# Patient Record
Sex: MALE | Race: WHITE | Employment: FULL TIME | URBAN - METROPOLITAN AREA
[De-identification: names, ages, dates, MRNs, and addresses within clinical notes are randomized per-mention and may not be internally consistent; named-entity substitution may affect disease eponyms.]

---

## 2019-04-01 ENCOUNTER — HOSPITAL ENCOUNTER (EMERGENCY)
Age: 19
Discharge: HOME | End: 2019-04-01
Payer: MEDICAID

## 2019-04-01 VITALS
OXYGEN SATURATION: 98 % | SYSTOLIC BLOOD PRESSURE: 128 MMHG | BODY MASS INDEX: 19.7 KG/M2 | HEIGHT: 68 IN | WEIGHT: 130 LBS | RESPIRATION RATE: 16 BRPM | HEART RATE: 74 BPM | DIASTOLIC BLOOD PRESSURE: 70 MMHG | TEMPERATURE: 98.4 F

## 2019-04-01 DIAGNOSIS — R21 RASH: ICD-10-CM

## 2019-04-01 DIAGNOSIS — L23.9 CUTANEOUS HYPERSENSITIVITY: ICD-10-CM

## 2019-04-01 DIAGNOSIS — B35.6 TINEA CRURIS: Primary | ICD-10-CM

## 2019-04-01 LAB
GLUCOSE BLD-MCNC: 92 MG/DL (ref 70–99)
PERFORMED ON: NORMAL

## 2019-04-01 PROCEDURE — 99283 EMERGENCY DEPT VISIT LOW MDM: CPT

## 2019-04-01 RX ORDER — DEXTROAMPHETAMINE SACCHARATE, AMPHETAMINE ASPARTATE MONOHYDRATE, DEXTROAMPHETAMINE SULFATE AND AMPHETAMINE SULFATE 7.5; 7.5; 7.5; 7.5 MG/1; MG/1; MG/1; MG/1
CAPSULE, EXTENDED RELEASE ORAL
Refills: 0 | COMMUNITY
Start: 2019-03-11

## 2019-04-01 RX ORDER — CETIRIZINE HYDROCHLORIDE 10 MG/1
TABLET ORAL
COMMUNITY
Start: 2015-02-07

## 2019-04-01 RX ORDER — CLOTRIMAZOLE 1 %
CREAM (GRAM) TOPICAL
Qty: 1 TUBE | Refills: 1 | Status: SHIPPED | OUTPATIENT
Start: 2019-04-01 | End: 2019-04-08

## 2019-04-01 ASSESSMENT — ENCOUNTER SYMPTOMS
ABDOMINAL DISTENTION: 0
ALLERGIC/IMMUNOLOGIC NEGATIVE: 1
STRIDOR: 0
SHORTNESS OF BREATH: 0
VOMITING: 0
WHEEZING: 0
NAUSEA: 0
DIARRHEA: 0
COLOR CHANGE: 0
CONSTIPATION: 0
ABDOMINAL PAIN: 0
COUGH: 0

## 2019-04-01 ASSESSMENT — PAIN SCALES - GENERAL: PAINLEVEL_OUTOF10: 8

## 2019-04-02 NOTE — ED PROVIDER NOTES
**EVALUATED BY ADVANCED PRACTICE PROVIDER**        [address]  eMERGENCY dEPARTMENT eNCOUnter      Pt Name: Laura Lorenzo  LYX:[id number]  [birth year][place]ilio 2000  Date of evaluation: 4/1/2019  Provider: Mykel Sinclair PA-C      Chief Complaint:    Chief Complaint   Patient presents with    Rash     rash for forearm x2 weeks, complaint of rash to thighs as well and difficulty walking    Back Pain     complaints of nerve pain and feeling like his skin is on fire       Nursing Notes, Past Medical Hx, Past Surgical Hx, Social Hx, Allergies, and Family Hx were all reviewed and agreed with or any disagreements were addressed in the HPI.    HPI:  (Location, Duration, Timing, Severity,Quality, Assoc Sx, Context, Modifying factors)  This is a  20 y.o. male who presents to the emergency department complaining of rash on the left side of his groin for several weeks. He admits that he does sweat a lot in this area. He has no history of diabetes. He is a thin individual.  He denies any penile pain, discharge, testicular pain or swelling. He also reports that for the past week or so, he has had a skin irritation on the flexor surface of the right forearm. He wears a workband and around this wrist and thinks that this has irritated the area. Both groin and right wrist rashes are very itchy and nontender. Also, intermittently for several days, patient reports brief episodes of hypersensitivity to the upper back bilaterally. When it is present, the skin is very sensitive to light touch and feels a burning sensation. He is not experiencing this discomfort at this time. PastMedical/Surgical History:      Diagnosis Date    ADD (attention deficit disorder with hyperactivity)      History reviewed. No pertinent surgical history.     Medications:  Discharge Medication List as of 4/1/2019 10:47 PM      CONTINUE these medications which have NOT CHANGED    Details   cetirizine (ZYRTEC) 10 MG tablet . Historical Med      amphetamine-dextroamphetamine (ADDERALL XR) 30 MG extended release capsule TAKE ONE CAPSULE BY MOUTH EVERY MORNING, R-0Historical Med      UNKNOWN TO PATIENT Allergy medication      ibuprofen (ADVIL;MOTRIN) 600 MG tablet Take 1 tablet by mouth every 6 hours as needed for Pain, Disp-30 tablet, R-0      acetaminophen-codeine (TYLENOL/CODEINE #3) 300-30 MG per tablet Take 1 tablet by mouth every 4 hours as needed for Pain., Disp-20 tablet, R-0               Review of Systems:  Review of Systems   Constitutional: Negative for chills, fever and unexpected weight change. HENT: Negative. Eyes: Negative for visual disturbance. Respiratory: Negative for cough, shortness of breath, wheezing and stridor. Cardiovascular: Negative for chest pain, palpitations and leg swelling. Gastrointestinal: Negative for abdominal distention, abdominal pain, constipation, diarrhea, nausea and vomiting. Endocrine: Negative. Negative for polydipsia, polyphagia and polyuria. Genitourinary: Negative. Musculoskeletal: Negative for neck pain and neck stiffness. Back pain: skin hypersensitivity. Skin: Positive for rash. Negative for color change, pallor and wound. Allergic/Immunologic: Negative. Neurological: Negative for dizziness, tremors, seizures, syncope, facial asymmetry, speech difficulty, weakness, light-headedness, numbness and headaches. Hematological: Negative. Psychiatric/Behavioral: Negative for confusion. All other systems reviewed and are negative. Positives and Pertinent negatives as per HPI. Except as noted above in the ROS, problem specific ROS was completed and is negative. Physical Exam:  Physical Exam   Constitutional: He is oriented to person, place, and time. He appears well-developed and well-nourished. No distress. HENT:   Head: Normocephalic and atraumatic.    Right Ear: External ear normal.   Left Ear: External ear normal.   Nose: Nose normal. Mouth/Throat: Oropharynx is clear and moist.   Eyes: Pupils are equal, round, and reactive to light. Conjunctivae and EOM are normal. Right eye exhibits no discharge. Left eye exhibits no discharge. No scleral icterus. Neck: Normal range of motion. No JVD present. No Brudzinski's sign and no Kernig's sign noted. Cardiovascular: Normal rate and regular rhythm. No murmur heard. Pulmonary/Chest: Effort normal and breath sounds normal.   Abdominal: Soft. Bowel sounds are normal. He exhibits no abdominal bruit and no pulsatile midline mass. There is no tenderness. There is no rigidity, no rebound, no guarding, no CVA tenderness, no tenderness at McBurney's point and negative Logan's sign. Musculoskeletal: Normal range of motion. Cervical back: Normal.        Thoracic back: Normal.        Lumbar back: Normal.   No midline vertebral tenderness or step-off deformity. Negative straight leg raise. No saddle paresthesia or foot drop. Able to heel and toe walk without difficulty or deficit. 5/5 strength in all four extremities without focal weakness, paresthesia or radiculopathy   Lymphadenopathy:     He has no cervical adenopathy. Neurological: He is alert and oriented to person, place, and time. He displays normal reflexes. No sensory deficit. Skin: Skin is warm and dry. Capillary refill takes less than 2 seconds. Rash (tinea cruris rash on the left inguinal area. no other genital lesions, chancre, vesicle, abscess, edema. no tenderness. there is also a dry scaly patch of itchy skin on the flexor aspect of the distal right forearm) noted. He is not diaphoretic. No erythema. No pallor. No palmar or plantar rash     Psychiatric: He has a normal mood and affect. His behavior is normal.   Nursing note and vitals reviewed.       MEDICAL DECISION MAKING    Vitals:    Vitals:    04/01/19 2125   BP: 128/70   Pulse: 74   Resp: 16   Temp: 98.4 °F (36.9 °C)   SpO2: 98%   Weight: 130 lb (59 kg) Height: 5' 8\" (1.727 m)       LABS:  Labs Reviewed   POCT GLUCOSE    Narrative:     Performed at:  [facility]  [address], [address]   Phone [phone]   POCT GLUCOSE        Remainder of labs reviewed and werenegative at this time or not returned at the time of this note. RADIOLOGY:   Non-plain film images such as CT, Ultrasound and MRI are read by the radiologist. Sakshi Bonilla PA-C have directly visualized the radiologic plain film image(s) with the below findings:        Interpretation per the Radiologist below, if available at the time of thisnote:    No orders to display        No results found. MEDICAL DECISION MAKING / ED COURSE:      PROCEDURES:   Procedures    None    Patient was given:   Medications - No data to display    This patient presents complaining of left groin rash, right wrist rash and cutaneous hypersensitivity to the upper back intermittently. I cannot reproduce the upper back discomfort at this time. I do not see rash overlying this area. Glucose is stable, decreasing suspicion for diabetes. The rash in the groin consistent with tinea cruris. Rash on the right wrist could be consistent with contact dermatitis or possible fungal infection as well. The patient will be sent home with Lotrimin ointment and was given PCP and dermatology referrals. He was given return precautions. He denies history of IV drug abuse, HIV or other immunocompromised disease. My suspicion is low for life threatening skin infection SJS, EM, TEN, meningococcemia, Kawasaki disease, abscess, cellulitis, lymphangitis, lymphadenitis, RMSF, vasculitis, Lyme disease, HSP, angioedema, anaphylaxis, malar rash, syphilis, AIDS, bullous pemphigoid, pemphigus vulgaris, endocarditis, necrotizing fasciitis,fracture or dislocation, epidural abscess or hematoma, discitis, meningitis, encephalitis, transverse myelitis, pyelonephritis, perinephric abscess, urosepsis, kidney stone, AAA, dissection, shingles,  Or other concerning pathology. We have addressed concerns and expectations. The patient tolerated their visit well. I evaluated the patient. The physician was available for consultation as needed. The patient and / or the family were informed of the results of anytests, a time was given to answer questions, a plan was proposed and they agreed with plan. CLINICAL IMPRESSION:  1. Tinea cruris    2. Rash    3. Cutaneous hypersensitivity        DISPOSITION Decision To Discharge 04/01/2019 10:39:13 PM      PATIENT REFERRED TO:  [facility]    In 3 days      Izzy Romano MD  [address] [place] [address], [facility] [address]  [facility]  [phone]      for dermatology follow up      DISCHARGE MEDICATIONS:  Discharge Medication List as of 4/1/2019 10:47 PM      START taking these medications    Details   clotrimazole (LOTRIMIN AF) 1 % cream Apply topically 2 times daily. , Disp-1 Tube, R-1, Print             DISCONTINUED MEDICATIONS:  Discharge Medication List as of 4/1/2019 10:47 PM                 (Please note the MDM and HPI sections of this note were completed with a voice recognition program.  Efforts weremade to edit the dictations but occasionally words are mis-transcribed.)    Electronically signed, Greg Estrella PA-C,          Greg Estrella PA-C  04/02/19 9085

## 2019-04-02 NOTE — ED NOTES
Pt d/c instructions given, v/u. Denies needs at this time. A&O with no signs of distress. Pt ambulated to exit.        Kia Umanzor RN  04/01/19 6701

## 2019-12-02 VITALS
SYSTOLIC BLOOD PRESSURE: 121 MMHG | OXYGEN SATURATION: 97 % | TEMPERATURE: 98.4 F | DIASTOLIC BLOOD PRESSURE: 73 MMHG | HEART RATE: 102 BPM | RESPIRATION RATE: 16 BRPM

## 2019-12-02 LAB
BILIRUBIN URINE: NEGATIVE
BLOOD, URINE: NEGATIVE
CLARITY: ABNORMAL
COLOR: YELLOW
EPITHELIAL CELLS, UA: 0 /HPF (ref 0–5)
GLUCOSE URINE: NEGATIVE MG/DL
HYALINE CASTS: 15 /LPF (ref 0–8)
KETONES, URINE: NEGATIVE MG/DL
LEUKOCYTE ESTERASE, URINE: ABNORMAL
MICROSCOPIC EXAMINATION: YES
NITRITE, URINE: NEGATIVE
PH UA: 6.5 (ref 5–8)
PROTEIN UA: NEGATIVE MG/DL
RBC UA: 3 /HPF (ref 0–4)
SPECIFIC GRAVITY UA: 1.03 (ref 1–1.03)
URINE REFLEX TO CULTURE: YES
URINE TRICHOMONAS EVALUATION: NORMAL
URINE TYPE: ABNORMAL
UROBILINOGEN, URINE: 1 E.U./DL
WBC UA: 90 /HPF (ref 0–5)

## 2019-12-02 PROCEDURE — 87591 N.GONORRHOEAE DNA AMP PROB: CPT

## 2019-12-02 PROCEDURE — 81001 URINALYSIS AUTO W/SCOPE: CPT

## 2019-12-02 PROCEDURE — 87491 CHLMYD TRACH DNA AMP PROBE: CPT

## 2019-12-02 PROCEDURE — 87086 URINE CULTURE/COLONY COUNT: CPT

## 2019-12-03 ENCOUNTER — HOSPITAL ENCOUNTER (EMERGENCY)
Age: 19
Discharge: HOME | End: 2019-12-03
Payer: MEDICAID

## 2019-12-03 DIAGNOSIS — Z20.2 STD EXPOSURE: ICD-10-CM

## 2019-12-03 DIAGNOSIS — R36.9 PENILE DISCHARGE: Primary | ICD-10-CM

## 2019-12-03 DIAGNOSIS — N34.2 URETHRITIS: ICD-10-CM

## 2019-12-03 PROCEDURE — 99283 EMERGENCY DEPT VISIT LOW MDM: CPT

## 2019-12-03 PROCEDURE — 6360000002 HC RX W HCPCS: Performed by: PHYSICIAN ASSISTANT

## 2019-12-03 PROCEDURE — 6370000000 HC RX 637 (ALT 250 FOR IP): Performed by: PHYSICIAN ASSISTANT

## 2019-12-03 PROCEDURE — 96372 THER/PROPH/DIAG INJ SC/IM: CPT

## 2019-12-03 PROCEDURE — 2580000003 HC RX 258

## 2019-12-03 RX ORDER — PHENAZOPYRIDINE HYDROCHLORIDE 100 MG/1
200 TABLET, FILM COATED ORAL ONCE
Status: COMPLETED | OUTPATIENT
Start: 2019-12-03 | End: 2019-12-03

## 2019-12-03 RX ORDER — AZITHROMYCIN 250 MG/1
1000 TABLET, FILM COATED ORAL ONCE
Status: COMPLETED | OUTPATIENT
Start: 2019-12-03 | End: 2019-12-03

## 2019-12-03 RX ORDER — IBUPROFEN 800 MG/1
800 TABLET ORAL ONCE
Status: COMPLETED | OUTPATIENT
Start: 2019-12-03 | End: 2019-12-03

## 2019-12-03 RX ORDER — CEFTRIAXONE SODIUM 250 MG/1
250 INJECTION, POWDER, FOR SOLUTION INTRAMUSCULAR; INTRAVENOUS ONCE
Status: COMPLETED | OUTPATIENT
Start: 2019-12-03 | End: 2019-12-03

## 2019-12-03 RX ADMIN — WATER: 1 INJECTION INTRAMUSCULAR; INTRAVENOUS; SUBCUTANEOUS at 00:19

## 2019-12-03 RX ADMIN — CEFTRIAXONE SODIUM 250 MG: 250 INJECTION, POWDER, FOR SOLUTION INTRAMUSCULAR; INTRAVENOUS at 00:19

## 2019-12-03 RX ADMIN — AZITHROMYCIN MONOHYDRATE 1000 MG: 250 TABLET ORAL at 00:19

## 2019-12-03 RX ADMIN — IBUPROFEN 800 MG: 800 TABLET, FILM COATED ORAL at 00:18

## 2019-12-03 RX ADMIN — PHENAZOPYRIDINE 200 MG: 100 TABLET ORAL at 00:18

## 2019-12-03 ASSESSMENT — ENCOUNTER SYMPTOMS
RHINORRHEA: 0
VOMITING: 0
ABDOMINAL PAIN: 0
WHEEZING: 0
COUGH: 0
NAUSEA: 0
SHORTNESS OF BREATH: 0
DIARRHEA: 0

## 2019-12-03 ASSESSMENT — PAIN SCALES - GENERAL: PAINLEVEL_OUTOF10: 6

## 2019-12-04 LAB — URINE CULTURE, ROUTINE: NORMAL

## 2019-12-06 LAB
C. TRACHOMATIS DNA ,URINE: NEGATIVE
N. GONORRHOEAE DNA, URINE: POSITIVE

## 2021-11-29 ENCOUNTER — APPOINTMENT (OUTPATIENT)
Dept: GENERAL RADIOLOGY | Age: 21
End: 2021-11-29
Payer: MEDICAID

## 2021-11-29 ENCOUNTER — HOSPITAL ENCOUNTER (EMERGENCY)
Age: 21
Discharge: HOME | End: 2021-11-29
Payer: MEDICAID

## 2021-11-29 VITALS
RESPIRATION RATE: 16 BRPM | SYSTOLIC BLOOD PRESSURE: 145 MMHG | OXYGEN SATURATION: 99 % | WEIGHT: 142.7 LBS | HEIGHT: 68 IN | HEART RATE: 65 BPM | TEMPERATURE: 98.2 F | DIASTOLIC BLOOD PRESSURE: 84 MMHG | BODY MASS INDEX: 21.63 KG/M2

## 2021-11-29 DIAGNOSIS — M54.9 MID BACK PAIN: Primary | ICD-10-CM

## 2021-11-29 PROCEDURE — 99283 EMERGENCY DEPT VISIT LOW MDM: CPT

## 2021-11-29 PROCEDURE — 71101 X-RAY EXAM UNILAT RIBS/CHEST: CPT

## 2021-11-29 RX ORDER — LIDOCAINE 50 MG/G
1 PATCH TOPICAL DAILY
Qty: 30 PATCH | Refills: 0 | Status: SHIPPED | OUTPATIENT
Start: 2021-11-29

## 2021-11-29 RX ORDER — NAPROXEN 500 MG/1
500 TABLET ORAL 2 TIMES DAILY
Qty: 20 TABLET | Refills: 0 | Status: SHIPPED | OUTPATIENT
Start: 2021-11-29 | End: 2021-12-09

## 2021-11-29 RX ORDER — CYCLOBENZAPRINE HCL 10 MG
10 TABLET ORAL 3 TIMES DAILY PRN
Qty: 20 TABLET | Refills: 0 | Status: SHIPPED | OUTPATIENT
Start: 2021-11-29 | End: 2021-12-09

## 2021-11-29 ASSESSMENT — PAIN DESCRIPTION - DESCRIPTORS: DESCRIPTORS: SHARP

## 2021-11-29 ASSESSMENT — PAIN DESCRIPTION - PAIN TYPE: TYPE: ACUTE PAIN

## 2021-11-29 ASSESSMENT — PAIN SCALES - GENERAL: PAINLEVEL_OUTOF10: 8

## 2021-11-29 ASSESSMENT — PAIN DESCRIPTION - FREQUENCY: FREQUENCY: CONTINUOUS

## 2021-11-29 ASSESSMENT — PAIN DESCRIPTION - LOCATION: LOCATION: BACK

## 2021-11-29 NOTE — ED PROVIDER NOTES
[facility]        Pt Name: Papa Stanley  MRN: [id number]  [place] 2000  Date of evaluation: 11/29/2021  Provider: Bandar Zuñiga PA-C  PCP: [facility]  Note Started: 5:44 PM EST       [place]. I have evaluated this patient. My supervising physician was available for consultation. CHIEF COMPLAINT       Chief Complaint   Patient presents with    Back Pain     Patient arrives via triage complaining of back pain that has been going on since Thursday. States he was picking up a younger sibling on Thursday and felt a pull then. HISTORY OF PRESENT ILLNESS   (Location, Timing/Onset, Context/Setting, Quality, Duration, Modifying Factors, Severity, Associated Signs and Symptoms)  Note limiting factors. Chief Complaint: Right flank and mid back pain    Papa Stanley is a 24 y.o. male who presents to the emergency department with a chief complaint of some right mid back pain that comes or on the right flank over into the ribs. He states this happened 4 days ago. He states he was playing around with a younger sibling when he picked him up and felt immediate pain in that side. This is worse with movement, taking a deep breath. Denies any direct fall or injury to his back. Denies any midline neck or back pain. Denies cough, mopped assist, history of PE or DVT, recent trip or travel, recent surgery, recent trauma, unilateral leg swelling or tenderness, hormone replacement or testosterone use. Denies abdominal pain, difficulty moving his extremities, numbness, loss of bowel or bladder function or any urinary or bowel symptoms. Rates pain an 8 out of 10. Nursing Notes were all reviewed and agreed with or any disagreements were addressed in the HPI. REVIEW OF SYSTEMS    (2-9 systems for level 4, 10 or more for level 5)     Review of Systems    Positives and Pertinent negatives as per HPI.  Except as noted above in the ROS, all other systems were reviewed and negative. PAST MEDICAL HISTORY     Past Medical History:   Diagnosis Date    ADD (attention deficit disorder with hyperactivity)          SURGICAL HISTORY   History reviewed. No pertinent surgical history. [address]       Discharge Medication List as of 11/29/2021  6:39 PM      CONTINUE these medications which have NOT CHANGED    Details   cetirizine (ZYRTEC) 10 MG tablet . Historical Med      amphetamine-dextroamphetamine (ADDERALL XR) 30 MG extended release capsule TAKE ONE CAPSULE BY MOUTH EVERY MORNING, R-0Historical Med      UNKNOWN TO PATIENT Allergy medication      ibuprofen (ADVIL;MOTRIN) 600 MG tablet Take 1 tablet by mouth every 6 hours as needed for Pain, Disp-30 tablet, R-0      acetaminophen-codeine (TYLENOL/CODEINE #3) 300-30 MG per tablet Take 1 tablet by mouth every 4 hours as needed for Pain., Disp-20 tablet, R-0               ALLERGIES     Vicodin [hydrocodone-acetaminophen]    FAMILYHISTORY     History reviewed. No pertinent family history. SOCIAL HISTORY       Social History     Tobacco Use    Smoking status: Current Every Day Smoker     Packs/day: 0.50     Types: Cigarettes    Smokeless tobacco: Never Used   Substance Use Topics    Alcohol use: No    Drug use: Yes     Types: Marijuana (Weed)     Comment: Marijuana every other day       SCREENINGS             PHYSICAL EXAM    (up to 7 for level 4, 8 or more for level 5)     ED Triage Vitals [11/29/21 1714]   BP Temp Temp Source Pulse Resp SpO2 Height Weight   (!) 145/84 98.2 °F (36.8 °C) Oral 65 16 99 % 5' 8\" (1.727 m) 142 lb 11.2 oz (64.7 kg)       Physical Exam  Vitals and nursing note reviewed. Constitutional:       Appearance: He is well-developed. He is not diaphoretic. HENT:      Head: Atraumatic. Nose: Nose normal.   Eyes:      General:         Right eye: No discharge. Left eye: No discharge.    Cardiovascular:      Rate and Rhythm: Normal rate and regular rhythm. Heart sounds: No murmur heard. No friction rub. No gallop. Pulmonary:      Effort: Pulmonary effort is normal. No respiratory distress. Breath sounds: No stridor. No wheezing, rhonchi or rales. Comments: Some tenderness around the right posterior lateral ribs. No flail chest or obvious sign of trauma. Chest:      Chest wall: Tenderness present. Abdominal:      General: Bowel sounds are normal. There is no distension. Palpations: Abdomen is soft. There is no mass. Tenderness: There is no abdominal tenderness. There is no guarding or rebound. Hernia: No hernia is present. Musculoskeletal:         General: No swelling. Normal range of motion. Cervical back: Normal range of motion. Skin:     General: Skin is warm and dry. Findings: No erythema or rash. Neurological:      Mental Status: He is alert and oriented to person, place, and time. Cranial Nerves: No cranial nerve deficit. Psychiatric:         Behavior: Behavior normal.         DIAGNOSTIC RESULTS   LABS:    Labs Reviewed - No data to display    When ordered only abnormal lab results are displayed. All other labs were within normal range or not returned as of this dictation. EKG: When ordered, EKG's are interpreted by the Emergency Department Physician in the absence of a cardiologist.  Please see their note for interpretation of EKG. RADIOLOGY:   Non-plain film images such as CT, Ultrasound and MRI are read by the radiologist. Plain radiographic images are visualized and preliminarily interpreted by the ED Provider with the below findings:        Interpretation per the Radiologist below, if available at the time of this note:    XR RIBS RIGHT INCLUDE CHEST (MIN 3 VIEWS)   Final Result   No rib fracture or evidence of acute cardiopulmonary disease. No results found.         PROCEDURES   Unless otherwise noted below, none     Procedures    CRITICAL CARE TIME N/A    CONSULTS:  None      EMERGENCY DEPARTMENT COURSE and DIFFERENTIAL DIAGNOSIS/MDM:   Vitals:    Vitals:    11/29/21 1714   BP: (!) 145/84   Pulse: 65   Resp: 16   Temp: 98.2 °F (36.8 °C)   TempSrc: Oral   SpO2: 99%   Weight: 142 lb 11.2 oz (64.7 kg)   Height: 5' 8\" (1.727 m)       Patient was given the following medications:  Medications - No data to display        PERC Rule:  Applicable in this patient who has low clinical suspicion for pulmonary embolism. Age < 48years old: Yes  Heart rate < 100 bpm: Yes  Oxygen saturation > 95%: Yes  Hemoptysis: No  Exogenous estrogen use: No  Prior history of DVT or PE: No  Unilateral leg swelling: No  Surgery or significant trauma in the past 4 weeks: No    Based on the above, PE can effectively be ruled out without further testing. Patient presented with some pain around the right mid and lateral back around the ribs after picking up his brother and playing with him 4 days ago. He is distally neurovascularly intact. No obvious sign of trauma. He is mostly worried about his ribs. X-ray imaging is unremarkable. No midline tenderness of the neck or back. Suspicion for pneumothorax, pulmonary embolus, aortic dissection, epidural abscess, cord injury, vertebral fracture or other emergent etiology. He was educated symptomatic treatment at home. Will follow-up as an outpatient return here for any worsening of symptoms or problems at home. FINAL IMPRESSION      1.  Mid back pain          DISPOSITION/PLAN   DISPOSITION Decision To Discharge 11/29/2021 06:35:30 PM      PATIENT REFERRED TO:  [facility]    Schedule an appointment as soon as possible for a visit in 3 days  For re-check    [facility] Emergency Department  [address] [place] [address]  [phone]    As needed      DISCHARGE MEDICATIONS:  Discharge Medication List as of 11/29/2021  6:39 PM      START taking these medications    Details   naproxen (NAPROSYN) 500 MG tablet Take 1 tablet by mouth 2 times daily for 20 doses, Disp-20 tablet, R-0Normal      lidocaine (LIDODERM) 5 % Place 1 patch onto the skin daily 12 hours on, 12 hours off., Disp-30 patch, R-0Normal      cyclobenzaprine (FLEXERIL) 10 MG tablet Take 1 tablet by mouth 3 times daily as needed for Muscle spasms, Disp-20 tablet, R-0Normal             DISCONTINUED MEDICATIONS:  Discharge Medication List as of 11/29/2021  6:39 PM                 (Please note that portions of this note were completed with a voice recognition program.  Efforts were made to edit the dictations but occasionally words are mis-transcribed.)    Madiha Bravo PA-C (electronically signed)            Madiha Bravo PA-C  11/29/21 4881

## 2023-03-03 ENCOUNTER — HOSPITAL ENCOUNTER (EMERGENCY)
Age: 23
Discharge: HOME | End: 2023-03-03
Payer: MEDICAID

## 2023-03-03 VITALS
RESPIRATION RATE: 16 BRPM | BODY MASS INDEX: 22.35 KG/M2 | HEIGHT: 68 IN | SYSTOLIC BLOOD PRESSURE: 146 MMHG | DIASTOLIC BLOOD PRESSURE: 64 MMHG | HEART RATE: 81 BPM | TEMPERATURE: 98 F | WEIGHT: 147.5 LBS | OXYGEN SATURATION: 100 %

## 2023-03-03 DIAGNOSIS — S61.211A LACERATION OF LEFT INDEX FINGER WITHOUT FOREIGN BODY WITHOUT DAMAGE TO NAIL, INITIAL ENCOUNTER: Primary | ICD-10-CM

## 2023-03-03 PROCEDURE — 90471 IMMUNIZATION ADMIN: CPT | Performed by: PHYSICIAN ASSISTANT

## 2023-03-03 PROCEDURE — 90714 TD VACC NO PRESV 7 YRS+ IM: CPT | Performed by: PHYSICIAN ASSISTANT

## 2023-03-03 PROCEDURE — 6360000002 HC RX W HCPCS: Performed by: PHYSICIAN ASSISTANT

## 2023-03-03 PROCEDURE — 12001 RPR S/N/AX/GEN/TRNK 2.5CM/<: CPT

## 2023-03-03 PROCEDURE — 99284 EMERGENCY DEPT VISIT MOD MDM: CPT

## 2023-03-03 RX ADMIN — CLOSTRIDIUM TETANI TOXOID ANTIGEN (FORMALDEHYDE INACTIVATED) AND CORYNEBACTERIUM DIPHTHERIAE TOXOID ANTIGEN (FORMALDEHYDE INACTIVATED) 0.5 ML: 5; 2 INJECTION, SUSPENSION INTRAMUSCULAR at 21:04

## 2023-03-03 ASSESSMENT — PAIN - FUNCTIONAL ASSESSMENT: PAIN_FUNCTIONAL_ASSESSMENT: 0-10

## 2023-03-03 ASSESSMENT — PAIN SCALES - GENERAL: PAINLEVEL_OUTOF10: 6

## 2023-03-04 NOTE — DISCHARGE INSTRUCTIONS
4 sutures placed today over the left index finger follow-up primary care doctor in the next 5 to 7 days to have sutures removed    Keep bandage over wound when working with your hands otherwise she can let the wound open to air. Return to the emergency room if you have any worsening pain redness swelling or discharge coming from the wound    Take Tylenol ibuprofen for pain. Your tetanus was updated today.

## 2023-03-04 NOTE — ED PROVIDER NOTES
[address] [facility]        Pt Name: Nelly Batista  MRN: [id number]  [place] 2000  Date of evaluation: 3/3/2023  Provider: VAISHALI Ta  PCP: [facility]  Note Started: 8:38 PM EST 3/3/23      [place]. I have evaluated this patient. My supervising physician was available for consultation. CHIEF COMPLAINT       Chief Complaint   Patient presents with    Laceration     Pt has a laceration on his left index finger       HISTORY OF PRESENT ILLNESS: 1 or more Elements     History from : Patient    Limitations to history : None    Nelly Batista is a 24 y.o. male who presents to the emergency room due to laceration to the left index finger after accidentally cutting self with his own pocket knife. Patient has gross sensation to light touch over the finger. Patient is unsure when his last tetanus was. Patient states that he is able to flex and extend the finger without much difficulty. Nursing Notes were all reviewed and agreed with or any disagreements were addressed in the HPI. REVIEW OF SYSTEMS :      Review of Systems    Positives and Pertinent negatives as per HPI. SURGICAL HISTORY   History reviewed. No pertinent surgical history. CURRENTMEDICATIONS       Previous Medications    ACETAMINOPHEN-CODEINE (TYLENOL/CODEINE #3) 300-30 MG PER TABLET    Take 1 tablet by mouth every 4 hours as needed for Pain. AMPHETAMINE-DEXTROAMPHETAMINE (ADDERALL XR) 30 MG EXTENDED RELEASE CAPSULE    TAKE ONE CAPSULE BY MOUTH EVERY MORNING    CETIRIZINE (ZYRTEC) 10 MG TABLET    . IBUPROFEN (ADVIL;MOTRIN) 600 MG TABLET    Take 1 tablet by mouth every 6 hours as needed for Pain    LIDOCAINE (LIDODERM) 5 %    Place 1 patch onto the skin daily 12 hours on, 12 hours off.     NAPROXEN (NAPROSYN) 500 MG TABLET    Take 1 tablet by mouth 2 times daily for 20 doses    UNKNOWN TO PATIENT    Allergy medication       ALLERGIES     Vicodin [hydrocodone-acetaminophen]    FAMILYHISTORY     History reviewed. No pertinent family history. SOCIAL HISTORY       Social History     Tobacco Use    Smoking status: Every Day     Packs/day: 0.50     Types: Cigarettes    Smokeless tobacco: Never   Substance Use Topics    Alcohol use: No    Drug use: Yes     Types: Marijuana (Weed)     Comment: Marijuana every other day       SCREENINGS        Jaclyn Coma Scale  Eye Opening: Spontaneous  Best Verbal Response: Oriented  Best Motor Response: Obeys commands  Sunshine Coma Scale Score: 15                CIWA Assessment  BP: (!) 146/64  Heart Rate: 81           PHYSICAL EXAM  1 or more Elements     ED Triage Vitals [03/03/23 2036]   BP Temp Temp src Pulse Resp SpO2 Height Weight   -- -- -- -- -- -- 5' 8\" (1.727 m) 147 lb 8 oz (66.9 kg)       Physical Exam  Constitutional:       General: He is not in acute distress. Appearance: Normal appearance. He is normal weight. He is not ill-appearing. Cardiovascular:      Comments: Bilateral radial pulses equal intact 2+. Musculoskeletal:      Comments: Patient has full range of motion of the left index finger. Skin:     Findings: Laceration present. Comments: Proximally 1.5 cm laceration over the left index finger at the proximal phalanx. Patient has gross sensation to light touch throughout the skin of the left hand and left index finger. Neurological:      Mental Status: He is alert. Psychiatric:         Mood and Affect: Mood normal.         Behavior: Behavior normal.           DIAGNOSTIC RESULTS   LABS:    Labs Reviewed - No data to display    When ordered only abnormal lab results are displayed. All other labs were within normal range or not returned as of this dictation. EKG: When ordered, EKG's are interpreted by the Emergency Department Physician in the absence of a cardiologist.  Please see their note for interpretation of EKG.     RADIOLOGY:   Non-plain film images such as CT, Ultrasound and MRI are read by the radiologist. Plain radiographic images are visualized and preliminarily interpreted by the ED Provider with the below findings:        Interpretation per the Radiologist below, if available at the time of this note:    No orders to display     No results found. No results found.     PROCEDURES   Unless otherwise noted below, none     Lac Repair    Date/Time: 3/3/2023 9:01 PM  Performed by: VAISHALI Yanez  Authorized by: VAISHALI Yanez     Consent:     Consent obtained:  Verbal    Consent given by:  Patient    Risks discussed:  Infection, pain and retained foreign body  Universal protocol:     Procedure explained and questions answered to patient or proxy's satisfaction: yes      Patient identity confirmed:  Verbally with patient  Anesthesia:     Anesthesia method:  Nerve block    Block location:  Left index finger    Block needle gauge:  27 G    Block anesthetic:  Lidocaine 1% w/o epi    Block injection procedure:  Anatomic landmarks identified, introduced needle, incremental injection, anatomic landmarks palpated and negative aspiration for blood    Block outcome:  Anesthesia achieved  Laceration details:     Location:  Finger    Finger location:  L index finger    Length (cm):  1.5    Depth (mm):  1  Pre-procedure details:     Preparation:  Patient was prepped and draped in usual sterile fashion  Exploration:     Limited defect created (wound extended): no      Hemostasis achieved with:  Direct pressure    Contaminated: no    Treatment:     Area cleansed with:  Povidone-iodine and chlorhexidine    Amount of cleaning:  Standard    Irrigation solution:  Sterile saline    Irrigation volume:  120mL    Irrigation method:  Pressure wash    Visualized foreign bodies/material removed: no      Debridement:  None    Undermining:  None    Scar revision: no    Skin repair:     Repair method:  Sutures    Suture size:  4-0    Suture material:  Nylon    Suture technique:  Simple interrupted    Number of sutures:  4  Approximation:     Approximation:  Close  Repair type:     Repair type:  Simple  Post-procedure details:     Dressing:  Antibiotic ointment and adhesive bandage    Procedure completion:  Tolerated    CRITICAL CARE TIME (.cctime)       PAST MEDICAL HISTORY      has a past medical history of ADD (attention deficit disorder with hyperactivity). Chronic Conditions affecting Care:     EMERGENCY DEPARTMENT COURSE and DIFFERENTIAL DIAGNOSIS/MDM:   Vitals:    Vitals:    03/03/23 2036 03/03/23 2037 03/03/23 2039   BP:  (!) 146/64    Pulse:  81    Resp:  16    Temp:   98 °F (36.7 °C)   SpO2:  100%    Weight: 147 lb 8 oz (66.9 kg)     Height: 5' 8\" (1.727 m)         Patient was given the following medications:  Medications   tetanus & diphtheria toxoids (adult) 5-2 LFU injection 0.5 mL (0.5 mLs IntraMUSCular Given 3/3/23 2104)             Is this patient to be included in the SEP-1 Core Measure due to severe sepsis or septic shock? No   Exclusion criteria - the patient is NOT to be included for SEP-1 Core Measure due to: Infection is not suspected    CONSULTS: (Who and What was discussed)  None  Discussion with Other Profesionals : None    Social Determinants : None    Records Reviewed : None    CC/HPI Summary, DDx, ED Course, and Reassessment: 71-year-old male presents emergency room due to laceration to the left index finger after he accidentally cut himself with his own knife. Unknown of last tetanus vaccine. On exam patient has full range of motion of the index finger and good sensation to light touch throughout. There is a 1.5 cm linear laceration on the palmar aspect of the left index finger. Radial pulse intact. Capillary refill less than 2 seconds. Laceration repair was done by myself with 4 4.0 nylon sutures simple interrupted. Patient tolerated procedure well    Patient structured to return to emergency room if he has any redness swelling or wound discharge.   Patient also encouraged to follow-up with primary care doctor or to return here in the emergency department in 5 to 7 days to have sutures removed. Tetanus was updated today    Suspicion for cellulitis, abscess, necrotizing fasciitis, tendon injury. Disposition Considerations (include 1 Tests not done, Shared Decision Making, Pt Expectation of Test or Tx.): Considered obtaining x-ray of the finger but the laceration was not as deep and there was no signs of foreign body on my examination and this was a clean brand-new knife that he accidentally cut himself with. Appropriate for outpatient management        I am the Primary Clinician of Record. FINAL IMPRESSION      1.  Laceration of left index finger without foreign body without damage to nail, initial encounter          DISPOSITION/PLAN     DISPOSITION Decision To Discharge 03/03/2023 09:00:12 PM      PATIENT REFERRED TO:  [facility]    Schedule an appointment as soon as possible for a visit in 1 week  [facility] Emergency Department  [address] [place] [address]  [phone]    As needed, If symptoms worsen    DISCHARGE MEDICATIONS:  New Prescriptions    No medications on file       DISCONTINUED MEDICATIONS:  Discontinued Medications    No medications on file              (Please note that portions of this note were completed with a voice recognition program.  Efforts were made to edit the dictations but occasionally words are mis-transcribed.)    VAISHALI Maldonado (electronically signed)            Vicky Maldonado  03/03/23 4827